# Patient Record
Sex: FEMALE | Race: WHITE
[De-identification: names, ages, dates, MRNs, and addresses within clinical notes are randomized per-mention and may not be internally consistent; named-entity substitution may affect disease eponyms.]

---

## 2018-05-21 ENCOUNTER — HOSPITAL ENCOUNTER (OUTPATIENT)
Dept: HOSPITAL 88 - MRI | Age: 58
End: 2018-05-21
Attending: FAMILY MEDICINE
Payer: COMMERCIAL

## 2018-05-21 DIAGNOSIS — M87.051: Primary | ICD-10-CM

## 2018-05-21 DIAGNOSIS — M16.11: ICD-10-CM

## 2018-05-21 NOTE — DIAGNOSTIC IMAGING REPORT
TECHNIQUE:

Magnetic resonance imaging of the RIGHT HIP was performed WITHOUT injected

contrast. 



HISTORY:  Right hip pain

COMPARISON: None available.



FINDINGS: 

Bone:  

No acute fracture. Bone marrow edema within the femoral head and the

acetabulum.



Femoroacetabular Joint:

     Acetabular labrum:  Diffuse degenerative labral tearing.

     Articular Cartilage:  Diffuse full-thickness cartilage loss with

subchondral edema and cystic change.



Muscle and tendons:  Iliopsoas tendon intact. Hamstring origins intact. Gluteal

tendons intact.



Soft tissues:  Otherwise unremarkable.





IMPRESSION:



Severe degenerative arthrosis of the right hip with full thickness cartilage

loss, subchondral edema and cystic change.



Signed by: Dr. Andrew Palisch, M.D. on 5/21/2018 4:59 PM

## 2019-02-19 ENCOUNTER — HOSPITAL ENCOUNTER (OUTPATIENT)
Dept: HOSPITAL 88 - MRI | Age: 59
End: 2019-02-19
Attending: FAMILY MEDICINE
Payer: COMMERCIAL

## 2019-02-19 DIAGNOSIS — M19.071: Primary | ICD-10-CM

## 2019-02-19 NOTE — DIAGNOSTIC IMAGING REPORT
TECHNIQUE:

Magnetic resonance imaging of the RIGHT ANKLE was performed WITHOUT injected

contrast. 



COMPARISON: None available.

HISTORY: Pain at the lateral aspect of the ankle



FINDINGS: 

LIGAMENTS:

     Medial Complex:  Deltoid intact.

     Lateral Complex:  Tibiofibular ligaments intact. Attenuation of the

anterior talofibular and calcaneofibular ligament.



TENDONS:

     Medial:   Intact

     Lateral:  Intact

     Anterior:  Intact

     Achilles:  Intact



BONES:

Nondisplaced transverse fracture of the distal fibula above the plafond.



JOINTS:

     Cartilage:   No focal defect is identified involving the tibiotalar joint.

     Other:  Fluid within the joints is within physiologic limits.



SOFT TISSUES:  

Soft tissue edema around the fracture site.





IMPRESSION: 



Transverse stress fracture of the distal fibula above the plafond.



Patient notified directly of the finding at the time of this dictation and

instructed to follow-up with Dr. Hernandes..



Signed by: Dr. Andrew Palisch, M.D. on 2/19/2019 9:22 AM